# Patient Record
Sex: MALE | Race: BLACK OR AFRICAN AMERICAN | NOT HISPANIC OR LATINO | Employment: FULL TIME | URBAN - METROPOLITAN AREA
[De-identification: names, ages, dates, MRNs, and addresses within clinical notes are randomized per-mention and may not be internally consistent; named-entity substitution may affect disease eponyms.]

---

## 2024-10-27 ENCOUNTER — HOSPITAL ENCOUNTER (EMERGENCY)
Facility: HOSPITAL | Age: 39
Discharge: HOME | End: 2024-10-27
Attending: EMERGENCY MEDICINE | Admitting: EMERGENCY MEDICINE
Payer: MEDICAID

## 2024-10-27 VITALS
RESPIRATION RATE: 18 BRPM | BODY MASS INDEX: 27.4 KG/M2 | OXYGEN SATURATION: 97 % | DIASTOLIC BLOOD PRESSURE: 95 MMHG | WEIGHT: 185 LBS | HEIGHT: 69 IN | HEART RATE: 101 BPM | SYSTOLIC BLOOD PRESSURE: 151 MMHG | TEMPERATURE: 98 F

## 2024-10-27 DIAGNOSIS — S01.01XA SCALP LACERATION, INITIAL ENCOUNTER: Primary | ICD-10-CM

## 2024-10-27 PROCEDURE — 25010000002 LIDOCAINE 1% - EPINEPHRINE 1:100000 1 %-1:100000 SOLUTION: Performed by: EMERGENCY MEDICINE

## 2024-10-27 PROCEDURE — 90471 IMMUNIZATION ADMIN: CPT

## 2024-10-27 PROCEDURE — 99283 EMERGENCY DEPT VISIT LOW MDM: CPT

## 2024-10-27 PROCEDURE — 90715 TDAP VACCINE 7 YRS/> IM: CPT | Performed by: EMERGENCY MEDICINE

## 2024-10-27 PROCEDURE — 25010000002 TETANUS-DIPHTH-ACELL PERTUSSIS 5-2.5-18.5 LF-MCG/0.5 SUSPENSION PREFILLED SYRINGE: Performed by: EMERGENCY MEDICINE

## 2024-10-27 RX ORDER — IBUPROFEN 200 MG
600 TABLET ORAL ONCE
Status: COMPLETED | OUTPATIENT
Start: 2024-10-27 | End: 2024-10-27

## 2024-10-27 RX ORDER — LIDOCAINE HYDROCHLORIDE AND EPINEPHRINE 10; 10 MG/ML; UG/ML
10 INJECTION, SOLUTION INFILTRATION; PERINEURAL ONCE
Status: COMPLETED | OUTPATIENT
Start: 2024-10-27 | End: 2024-10-27

## 2024-10-27 RX ADMIN — TETANUS TOXOID, REDUCED DIPHTHERIA TOXOID AND ACELLULAR PERTUSSIS VACCINE, ADSORBED 0.5 ML: 5; 2.5; 8; 8; 2.5 SUSPENSION INTRAMUSCULAR at 04:38

## 2024-10-27 RX ADMIN — LIDOCAINE HYDROCHLORIDE,EPINEPHRINE BITARTRATE 10 ML: 10; .01 INJECTION, SOLUTION INFILTRATION; PERINEURAL at 04:40

## 2024-10-27 RX ADMIN — IBUPROFEN 600 MG: 200 TABLET, FILM COATED ORAL at 04:37
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
## 2024-10-27 NOTE — Clinical Note
[facility] EMERGENCY DEPARTMENT [place]  [address]  [place] [facility] [address]  Phone: [phone]  Fax: [phone]    Fernanda Ren was seen and treated in our emergency department on 10/27/2024.  He may return to work on 10/30/2024.         Thank you for choosing [facility].    Avery Mancini MD

## 2024-10-27 NOTE — Clinical Note
[facility] EMERGENCY DEPARTMENT [place]  [address]  [place] [address]  Phone: [phone]  Fax: [phone]    Fernanda Ren was seen and treated in our emergency department on 10/27/2024.  He may return to work on 10/30/2024.         Thank you for choosing [facility].    Avery Mancini MD

## 2024-11-03 ENCOUNTER — HOSPITAL ENCOUNTER (EMERGENCY)
Facility: HOSPITAL | Age: 39
Discharge: HOME | End: 2024-11-03
Attending: EMERGENCY MEDICINE | Admitting: EMERGENCY MEDICINE
Payer: MEDICAID

## 2024-11-03 VITALS
SYSTOLIC BLOOD PRESSURE: 156 MMHG | HEIGHT: 69 IN | BODY MASS INDEX: 27.4 KG/M2 | DIASTOLIC BLOOD PRESSURE: 95 MMHG | OXYGEN SATURATION: 100 % | WEIGHT: 184.97 LBS | TEMPERATURE: 98.4 F | HEART RATE: 66 BPM | RESPIRATION RATE: 16 BRPM

## 2024-11-03 DIAGNOSIS — Z48.02 ENCOUNTER FOR STAPLE REMOVAL: Primary | ICD-10-CM

## 2024-11-03 PROCEDURE — 99202 OFFICE O/P NEW SF 15 MIN: CPT

## 2024-11-03 NOTE — FSED PROVIDER NOTE
"Subjective  History of Present Illness:    Patient presents suture removal a week ago staples placed healing well      Nurses Notes reviewed and agree, including vitals, allergies, social history and prior medical history.     REVIEW OF SYSTEMS: All systems reviewed and not pertinent unless noted.  Review of Systems    No past medical history on file.    Allergies:    Patient has no known allergies.      No past surgical history on file.      Social History     Socioeconomic History    Marital status: Single         No family history on file.    Objective  Physical Exam:  /95 (BP Location: Left arm, Patient Position: Sitting)   Pulse 66   Temp 98.4 °F (36.9 °C) (Oral)   Resp 16   Ht 175.3 cm (69.02\")   Wt 83.9 kg (184 lb 15.5 oz)   SpO2 100%   BMI 27.30 kg/m²      Physical Exam  HENT:      Head:      Comments: Scalp multiple staples in place with a well healed wound no swelling no erythema no drainage        Suture Removal    Date/Time: 11/3/2024 10:51 AM    Performed by: Richar Zamora DO  Authorized by: Richar Zamora DO    Consent:     Consent obtained:  Verbal    Consent given by:  Patient  Location:     Location:  Head/neck    Head/neck location:  Scalp  Procedure details:     Wound appearance:  No signs of infection, good wound healing and clean    Number of staples removed:  6  Post-procedure details:     Procedure completion:  Tolerated      ED Course:         Lab Results (last 24 hours)       ** No results found for the last 24 hours. **             No radiology results from the last 24 hrs       MDM      Well-healing wound staples removed patient tolerated and discharged      Data interpreted: Nursing notes reviewed, vital signs reviewed.  Labs independently interpreted by me (CBC, CMP, lipase, UA, troponin, ABG, lactic acid, procalcitonin).  Imaging independently interpreted by me (x-ray, CT scan).  EKG independently interpreted by me.  O2 saturation:    Counseling: Discussed the results " above with the patient regarding need for admission or discharge.  Patient understands and agrees plan of care.      -----  ED Disposition       ED Disposition   Discharge    Condition   Stable    Comment   --             Final diagnoses:   Encounter for staple removal     Your Follow-Up Providers    Follow-up information has not been specified.       Contact information for after-discharge care    Follow-up information has not been specified.          Your medication list      You have not been prescribed any medications.